# Patient Record
(demographics unavailable — no encounter records)

---

## 2024-10-23 NOTE — HISTORY OF PRESENT ILLNESS
[FreeTextEntry1] : The patient has Crohn's disease and has a history of a right hemicolectomy.  She had evidence of recurrent disease at the anastomosis and possible anal disease as well.  Her Stelara injection frequency was increased to every 4 weeks and she notes significant improvement.  Her last injection was on October 16 and her next 1 is scheduled for November 13.  She is having 2-3 bowel movements a day which are mostly solid although she will have occasional loose stools.  She denies melena or bright red blood per rectum.  She feels less tired and notes decreased abdominal cramps.  She is not awakening at night as she was previously.  She denies nausea, vomiting, heartburn, dysphagia.   Note from 7/25/2024 - The patient has Crohn's disease with a history of right hemicolectomy.  We reviewed the evaluations done since the patient's last visit on April 29, 2024.  Blood work from that day was normal.  A CT enterography performed on May 17, 2024 showed stable mild active Crohn's disease involving the distal neoterminal ileum.  Stelara levels proceeding the patient's last Stelara dose on July 10, 2024 with very low at 1.3 with negative antibody formation.  The patient had EGD and colonoscopy on July 2, 2024.  EGD was grossly normal with no significant findings on endoscopic biopsies.  Colonoscopy revealed that the patient is status post right hemicolectomy.  The anastomosis was ulcerated with several ulcers seen circumferentially.  Biopsies showed acute inflammation, erosions, granulation tissue, and fibropurulent exudate.  A biopsy from the sigmoid colon showed a minute focus of granuloma.  The patient also had ulcers at the dentate line that were not biopsied due to the location but likely represent active anal Crohn's disease.  The patient also has external hemorrhoids.  She does get rectal burning.  She notes that she has 3 bowel movements a day.  She says about 3 times a week, she will have diarrhea with associated abdominal cramping.  She denies melena or bright red blood per rectum.  She does note that she does better for the first 4 to 5 weeks after her Stelara injection and notes increased symptoms in the 2 to 3 weeks preceding her next Stelara injection.  She also gets nausea but denies vomiting.  She has no further dysphagia.  She notes occasional heartburn about once a week.  The patient's weight is stable.  She also notes that she gets bloating and gas which she believes is related to intake of dairy.   Note from 4/29/2024 - The patient was a patient of Dr. Madera, who has passed away.  The patient is now seeing me to transfer care. We spent some time reviewing the patient's medical history.  The patient is a 27-year-old woman who was last seen by Dr. Madera on 2/19/2023.  She has a history of Crohn's disease which was diagnosed at age 10.  She underwent laparoscopic small bowel resection with right hemicolectomy in 2009 at age 13.  At that time, she had an abscess.  She reports that 6 inches of terminal ileum were removed.  The patient has failed both Remicade and Humira and has been on Stelara every 8 weeks for the past 6 years.  Her last colonoscopy was in July 2020 which showed ulceration at the anastomosis.  The patient reports that she is lethargic.  She has 2 solid bowel movements a day without melena or bright red blood per rectum.  She gets episodes of right lower quadrant pain that last a few minutes at a time and occur 1-2 times a week.  She denies nausea, vomiting, heartburn.  For the past 8 months, the patient has had intermittent episodes of dysphagia to solids, primarily bread and meat.  She feels like she is choking.  She is able to drink liquids to push the food down.  She does not have regurgitation.  The patient's weight is stable.  The patient has not been admitted to the hospital in the past year and denies any cardiac issues.

## 2024-10-23 NOTE — CONSULT LETTER
[FreeTextEntry1] : Dear Dr. Velma Bauer,   I had the pleasure of seeing your patient ALBA SOUSA in the office today.  My office note is attached. PLEASE READ THE "ASSESSMENT" SECTION OF THE NOTE TO SEE MY IMPRESSION AND PLAN.   Thank you very much for allowing me to participate in the care of your patient.   Sincerely,   Ludin Stone M.D., FAC, FACP Director, Celiac Program at [facility]  of Medicine, [facility] [employer] [facility] [employer] at [facility] Adjunct  of Medicine, [facility] Practice Director, [facility] - Gastroenterology at [address] - [address] [place] [address] Tel: [phone] Email: [email or web address]     The attached note has been created using a voice recognition system (Dragon).  There may be some misspellings and typos.  Please call my office if you have any issues or questions.

## 2024-10-23 NOTE — ASSESSMENT
[FreeTextEntry1] : Patient with Crohn's disease and a history of right hemicolectomy with recurrent disease at the anastomosis and possible anal disease.  She is now getting her Stelara injections every 4 weeks.  With the increased frequency, she is feeling better.  Prior to the patient's next injection on November 13, she will go for blood work.  Bloodwork will be sent for CBC, Chem-bola, TSH, ESR, C-reactive protein, iron studies, B12, folate, vitamin D, and Stelara levels and antibodies.  Patient will return to see me in 3 months.  We will plan on the patient's next colonoscopy in July 2025.   Plan from 7/25/2024 - Patient with Crohn's disease who is status post right hemicolectomy in 2009.  She has evidence of recurrent disease at the surgical anastomosis.  There were also ulcers at the dentate line which may represent active anal Crohn's disease.  Her Stelara levels done at trough were very low 8 weeks after the previous injection.  She also has symptoms of bloating and gas which appear to be related to lactose intolerance.  She also has external hemorrhoids.  We will increase the frequency of Stelara injections to 90 mg every 4 weeks.  The patient's last dose of every 8 weeks was on July 10, 2024.  She will perform her next injection on August 7.  The patient was given Proctozone 2.5% cream to use as needed for the hemorrhoids.  The patient will try a 2-week lactose-free trial to see if her symptoms are related to lactose intolerance.  We discussed lactose intolerance and avoidance of dairy products. We also discussed the use of Lactaid products.  We will plan on repeating colonoscopy in 1 year that is July 2025.  Patient will return to see me in 3 months.   Plan from 4/29/2024 - Patient with Crohn's disease diagnosed at age 10 who underwent laparoscopic small bowel resection and right hemicolectomy in 2009.  She has previously failed Remicade and Humira and is now on Stelara every 8 weeks for the past 6 years.  She is doing generally well although she has lethargy and also has intermittent episodes of right lower quadrant pain.  She has had dysphagia to solids.  Her last colonoscopy was almost 4 years ago.  Bloodwork was sent for CBC, Chem-bola, TSH, ESR, C-reactive protein, iron studies, B12, folate, vitamin D, hepatitis B and C serologies, quantiferon gold, hCG.  An EGD and colonoscopy have been scheduled. The risks, benefits, alternatives, and limitations of the procedures, including the possibility of missed lesions, were explained.  The patient was sent for a CT enterography.  We will continue Stelara 90 mg every 8 weeks.

## 2025-01-29 NOTE — HISTORY OF PRESENT ILLNESS
[FreeTextEntry1] : The patient has Crohn's disease and has a history of a right hemicolectomy.  She has recurrent disease at the anastomosis and possible anal disease as well.  She is on Stelara injections every 4 weeks.  Her last injection was on January 14 and her next is on February 11.  She is doing well with 2-3 solid bowel movements a day without melena or bright red blood per rectum.  She denies abdominal pain.  She gets occasional nausea.  She denies vomiting, heartburn, dysphagia.  The patient's weight is stable.   Note from 10/23/2024 - The patient has Crohn's disease and has a history of a right hemicolectomy.  She had evidence of recurrent disease at the anastomosis and possible anal disease as well.  Her Stelara injection frequency was increased to every 4 weeks and she notes significant improvement.  Her last injection was on October 16 and her next 1 is scheduled for November 13.  She is having 2-3 bowel movements a day which are mostly solid although she will have occasional loose stools.  She denies melena or bright red blood per rectum.  She feels less tired and notes decreased abdominal cramps.  She is not awakening at night as she was previously.  She denies nausea, vomiting, heartburn, dysphagia.   Note from 7/25/2024 - The patient has Crohn's disease with a history of right hemicolectomy.  We reviewed the evaluations done since the patient's last visit on April 29, 2024.  Blood work from that day was normal.  A CT enterography performed on May 17, 2024 showed stable mild active Crohn's disease involving the distal neoterminal ileum.  Stelara levels proceeding the patient's last Stelara dose on July 10, 2024 with very low at 1.3 with negative antibody formation.  The patient had EGD and colonoscopy on July 2, 2024.  EGD was grossly normal with no significant findings on endoscopic biopsies.  Colonoscopy revealed that the patient is status post right hemicolectomy.  The anastomosis was ulcerated with several ulcers seen circumferentially.  Biopsies showed acute inflammation, erosions, granulation tissue, and fibropurulent exudate.  A biopsy from the sigmoid colon showed a minute focus of granuloma.  The patient also had ulcers at the dentate line that were not biopsied due to the location but likely represent active anal Crohn's disease.  The patient also has external hemorrhoids.  She does get rectal burning.  She notes that she has 3 bowel movements a day.  She says about 3 times a week, she will have diarrhea with associated abdominal cramping.  She denies melena or bright red blood per rectum.  She does note that she does better for the first 4 to 5 weeks after her Stelara injection and notes increased symptoms in the 2 to 3 weeks preceding her next Stelara injection.  She also gets nausea but denies vomiting.  She has no further dysphagia.  She notes occasional heartburn about once a week.  The patient's weight is stable.  She also notes that she gets bloating and gas which she believes is related to intake of dairy.   Note from 4/29/2024 - The patient was a patient of Dr. Madera, who has passed away.  The patient is now seeing me to transfer care. We spent some time reviewing the patient's medical history.  The patient is a 27-year-old woman who was last seen by Dr. Madera on 2/19/2023.  She has a history of Crohn's disease which was diagnosed at age 10.  She underwent laparoscopic small bowel resection with right hemicolectomy in 2009 at age 13.  At that time, she had an abscess.  She reports that 6 inches of terminal ileum were removed.  The patient has failed both Remicade and Humira and has been on Stelara every 8 weeks for the past 6 years.  Her last colonoscopy was in July 2020 which showed ulceration at the anastomosis.  The patient reports that she is lethargic.  She has 2 solid bowel movements a day without melena or bright red blood per rectum.  She gets episodes of right lower quadrant pain that last a few minutes at a time and occur 1-2 times a week.  She denies nausea, vomiting, heartburn.  For the past 8 months, the patient has had intermittent episodes of dysphagia to solids, primarily bread and meat.  She feels like she is choking.  She is able to drink liquids to push the food down.  She does not have regurgitation.  The patient's weight is stable.  The patient has not been admitted to the hospital in the past year and denies any cardiac issues.

## 2025-01-29 NOTE — CONSULT LETTER
[FreeTextEntry1] : Dear Dr. Velma Bauer,   I had the pleasure of seeing your patient ALBA SOUSA in the office today.  My office note is attached. PLEASE READ THE "ASSESSMENT" SECTION OF THE NOTE TO SEE MY IMPRESSION AND PLAN.   Thank you very much for allowing me to participate in the care of your patient.   Sincerely,   Ludin Stone M.D., FAC, FACP Director, Celiac Program at [facility]  of Medicine, [facility] at [facility] Adjunct  of Medicine, [facility] Practice Director, [facility] - Gastroenterology at [address] - [address] [place] [address] Tel: [phone] Email: [email or web address]     The attached note has been created using a voice recognition system (Dragon).  There may be some misspellings and typos.  Please call my office if you have any issues or questions.

## 2025-01-29 NOTE — ASSESSMENT
[FreeTextEntry1] : Patient with Crohn's disease and a history of right hemicolectomy who has recurrent disease at the anastomosis and possible anal disease.  She is doing well clinically on Stelara injections every 4 weeks.  Bloodwork was sent for CBC, Chem-bola, TSH, ESR, C-reactive protein, iron studies, B12, folate, vitamin D.  Stool will be sent for fecal calprotectin.  Patient will go to the lab for Stelara levels on February 11, prior to her next injection.  Patient is due for colonoscopy this summer.  We will schedule for August.  A colonoscopy has been scheduled. The risks, benefits, alternatives, and limitations of the procedure, including the possibility of missed lesions, were explained.   Plan from 10/23/2024 - Patient with Crohn's disease and a history of right hemicolectomy with recurrent disease at the anastomosis and possible anal disease.  She is now getting her Stelara injections every 4 weeks.  With the increased frequency, she is feeling better.  Prior to the patient's next injection on November 13, she will go for blood work.  Bloodwork will be sent for CBC, Chem-bola, TSH, ESR, C-reactive protein, iron studies, B12, folate, vitamin D, and Stelara levels and antibodies.  Patient will return to see me in 3 months.  We will plan on the patient's next colonoscopy in July 2025.   Plan from 7/25/2024 - Patient with Crohn's disease who is status post right hemicolectomy in 2009.  She has evidence of recurrent disease at the surgical anastomosis.  There were also ulcers at the dentate line which may represent active anal Crohn's disease.  Her Stelara levels done at trough were very low 8 weeks after the previous injection.  She also has symptoms of bloating and gas which appear to be related to lactose intolerance.  She also has external hemorrhoids.  We will increase the frequency of Stelara injections to 90 mg every 4 weeks.  The patient's last dose of every 8 weeks was on July 10, 2024.  She will perform her next injection on August 7.  The patient was given Proctozone 2.5% cream to use as needed for the hemorrhoids.  The patient will try a 2-week lactose-free trial to see if her symptoms are related to lactose intolerance.  We discussed lactose intolerance and avoidance of dairy products. We also discussed the use of Lactaid products.  We will plan on repeating colonoscopy in 1 year that is July 2025.  Patient will return to see me in 3 months.   Plan from 4/29/2024 - Patient with Crohn's disease diagnosed at age 10 who underwent laparoscopic small bowel resection and right hemicolectomy in 2009.  She has previously failed Remicade and Humira and is now on Stelara every 8 weeks for the past 6 years.  She is doing generally well although she has lethargy and also has intermittent episodes of right lower quadrant pain.  She has had dysphagia to solids.  Her last colonoscopy was almost 4 years ago.  Bloodwork was sent for CBC, Chem-bola, TSH, ESR, C-reactive protein, iron studies, B12, folate, vitamin D, hepatitis B and C serologies, quantiferon gold, hCG.  An EGD and colonoscopy have been scheduled. The risks, benefits, alternatives, and limitations of the procedures, including the possibility of missed lesions, were explained.  The patient was sent for a CT enterography.  We will continue Stelara 90 mg every 8 weeks.